# Patient Record
Sex: MALE | Race: WHITE
[De-identification: names, ages, dates, MRNs, and addresses within clinical notes are randomized per-mention and may not be internally consistent; named-entity substitution may affect disease eponyms.]

---

## 2021-01-17 ENCOUNTER — HOSPITAL ENCOUNTER (EMERGENCY)
Dept: HOSPITAL 74 - JVIRT | Age: 26
Discharge: HOME | End: 2021-01-17
Payer: COMMERCIAL

## 2021-01-17 DIAGNOSIS — Z20.822: Primary | ICD-10-CM

## 2021-01-17 PROCEDURE — C9803 HOPD COVID-19 SPEC COLLECT: HCPCS

## 2021-01-17 PROCEDURE — U0003 INFECTIOUS AGENT DETECTION BY NUCLEIC ACID (DNA OR RNA); SEVERE ACUTE RESPIRATORY SYNDROME CORONAVIRUS 2 (SARS-COV-2) (CORONAVIRUS DISEASE [COVID-19]), AMPLIFIED PROBE TECHNIQUE, MAKING USE OF HIGH THROUGHPUT TECHNOLOGIES AS DESCRIBED BY CMS-2020-01-R: HCPCS

## 2021-05-12 ENCOUNTER — TRANSCRIPTION ENCOUNTER (OUTPATIENT)
Age: 26
End: 2021-05-12

## 2021-09-09 ENCOUNTER — TRANSCRIPTION ENCOUNTER (OUTPATIENT)
Age: 26
End: 2021-09-09

## 2021-09-20 ENCOUNTER — TRANSCRIPTION ENCOUNTER (OUTPATIENT)
Age: 26
End: 2021-09-20

## 2021-10-11 ENCOUNTER — TRANSCRIPTION ENCOUNTER (OUTPATIENT)
Age: 26
End: 2021-10-11

## 2022-05-27 ENCOUNTER — NON-APPOINTMENT (OUTPATIENT)
Age: 27
End: 2022-05-27

## 2022-07-21 ENCOUNTER — NON-APPOINTMENT (OUTPATIENT)
Age: 27
End: 2022-07-21

## 2022-07-27 ENCOUNTER — NON-APPOINTMENT (OUTPATIENT)
Age: 27
End: 2022-07-27

## 2022-09-01 ENCOUNTER — NON-APPOINTMENT (OUTPATIENT)
Age: 27
End: 2022-09-01

## 2022-10-05 ENCOUNTER — NON-APPOINTMENT (OUTPATIENT)
Age: 27
End: 2022-10-05

## 2022-10-05 DIAGNOSIS — Z78.9 OTHER SPECIFIED HEALTH STATUS: ICD-10-CM

## 2022-10-05 DIAGNOSIS — Z87.09 PERSONAL HISTORY OF OTHER DISEASES OF THE RESPIRATORY SYSTEM: ICD-10-CM

## 2022-10-05 PROBLEM — Z00.00 ENCOUNTER FOR PREVENTIVE HEALTH EXAMINATION: Status: ACTIVE | Noted: 2022-10-05

## 2022-10-17 ENCOUNTER — NON-APPOINTMENT (OUTPATIENT)
Age: 27
End: 2022-10-17

## 2022-10-17 ENCOUNTER — LABORATORY RESULT (OUTPATIENT)
Age: 27
End: 2022-10-17

## 2022-10-17 ENCOUNTER — APPOINTMENT (OUTPATIENT)
Dept: GASTROENTEROLOGY | Facility: CLINIC | Age: 27
End: 2022-10-17

## 2022-10-17 VITALS
DIASTOLIC BLOOD PRESSURE: 64 MMHG | HEART RATE: 80 BPM | HEIGHT: 64 IN | WEIGHT: 112 LBS | BODY MASS INDEX: 19.12 KG/M2 | SYSTOLIC BLOOD PRESSURE: 118 MMHG

## 2022-10-17 DIAGNOSIS — Z84.89 FAMILY HISTORY OF OTHER SPECIFIED CONDITIONS: ICD-10-CM

## 2022-10-17 PROCEDURE — 99204 OFFICE O/P NEW MOD 45 MIN: CPT

## 2022-10-17 RX ORDER — LINACLOTIDE 72 UG/1
72 CAPSULE, GELATIN COATED ORAL
Refills: 0 | Status: ACTIVE | COMMUNITY

## 2022-10-17 NOTE — HISTORY OF PRESENT ILLNESS
[FreeTextEntry1] : \par \par  \par \par \par This HPI  reflects a summary and review of records : including previous and most recent  Labs, body imaging, consults and progress notes, operative and pathology reports, EKG reports, ED records, found in [facility], [facility],  [place] and any additional records brought in by  the patient at the time of the visit.\par \par \par \par PCP: Cl \par \par 26 yo M w h/o Allergies\par Hemorrhoids\par \par 10/17/22    Today:  no c/o , CP, SOB/ ELENA, Cough, Wheeze, Palpitations, edema\par \par 10/17/22   Today: Recently saw PCP and started on Linzess 72 qd fro IBS\par                   Patient has a 2 yr h/o stomach problems\par                   C/O stomach always churning, cruzito after eating, w/i 30 mins feels like he needs to defecate\par                   BMs: # 4 qod--usually w some straining and bloat\par                   Since Linzess had BM qd but diarrhea\par                   No brb/ mucous WA, jt pain, rash, visual changes\par                   Not a great sleeper\par                   works w yg troubled adults, 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\par                   [id number], usual, \par                   Diet:  first meal is Lunch: Rice & beans, occas D is chicken\par             \par \par * Abd pain-->lower, w bloating \par * Nausea--> occas \par * Vomit--> no\par * Early satiety--> no\par * Belching--> no\par * Hiccups--> no\par * Regurgitation--> no\par * Acid Taste / Water Brash--> no\par * Ht burn--> no\par * Dysphagia--> no\par * Throat Clearing--> no\par * Hoarseness--> no\par * Post-Nasal Drip--> no\par * Congestion--> no\par * Globus--> no\par * Cough--> no\par * Wheeze / PC-> -no\par * BMs: # 4 qod\par * Constipation--> yes \par * Diarrhea--> w Linzess\par * Bloating--> yes\par * Strain on Defecation--> yes\par * Incompl Evac--> no\par * Flatulence--> no\par * Gurgling--> yes\par * Melena--> no\par * BPBPR-> -no\par * Anorexia--> no\par * Wt. Loss--> no\par \par

## 2022-10-17 NOTE — REVIEW OF SYSTEMS
[Scleral Icterus (Yellow Eyes)] : no scleral icterus [Skin Lesions] : no skin lesions [Confused] : no confusion [Proptosis] : no proptosis [Easy Bruising] : no tendency for easy bruising

## 2022-10-17 NOTE — ASSESSMENT
[FreeTextEntry1] : \par \par 1. Lower Abd pain\par assoc w Bloat, Borborygmi, constipation\par on Linzess--> diarrhea\par \par R/O : IBD\par          Celiac\par          Malabsorption\par          IBS is a dx of Exclusion\par \par P: \par check the following:\par Labs: cbc, cmet, esr, crp, iron/celiac/IBD/food allergy panels, B12, FA, TSH,  Vit D, lipase, \par        \par   would decr Linzess to 72mg qod for now\par      If IBS would improve diet first, since it is very low in Fiber\par \par Recommend: \par * Diet: moderate -High Fiber,  Low Fat & Lactose free, Low FODMAPs--was reviewed & emphasized\par * Daily fluid intake was reviewed : 6  --  8 cups of decaffeinated fluid daily was emphasized\par * Regular aerobic exercise was emphasized\par * Probiotics  1  daily\par * Miralax 1/2 cap qd \par * Neuromodulation:  not currently required\par \par \par \par \par \par \par \par 2. Hemorrhoids:  well - controlled.  No pain,  swelling,  itch,  bleeding\par * Discussed  the  potential complications of thrombosis,  pain,  infection,  swelling, itching,  bleeding --none recently\par Recommendations: \par * Moderate- High  Fiber Diet was reviewed and emphasized\par * 6  --  8 cups of decaffeinated fluid daily was emphasized \par * Sitz Bathes as needed ,  No:  Anusol HC  Suppos / Cream  WA BID -- was needed\par * No:  Tucks BID,  Balneol Lotion,   Calmoseptine Oint -- was needed ;    can use  Prep H prn\par * No:  need for  Colorectal surgical evaluation for possible ablation \par \par \par \par \par \par \par \par

## 2022-10-18 LAB
ALBUMIN SERPL ELPH-MCNC: 5.2 G/DL
ALP BLD-CCNC: 66 U/L
ALT SERPL-CCNC: 21 U/L
ANION GAP SERPL CALC-SCNC: 11 MMOL/L
AST SERPL-CCNC: 22 U/L
BASOPHILS # BLD AUTO: 0.02 K/UL
BASOPHILS NFR BLD AUTO: 0.2 %
BILIRUB SERPL-MCNC: 0.2 MG/DL
BUN SERPL-MCNC: 5 MG/DL
CALCIUM SERPL-MCNC: 10.1 MG/DL
CHLORIDE SERPL-SCNC: 103 MMOL/L
CO2 SERPL-SCNC: 28 MMOL/L
CREAT SERPL-MCNC: 0.81 MG/DL
CRP SERPL-MCNC: <3 MG/L
EGFR: 125 ML/MIN/1.73M2
EOSINOPHIL # BLD AUTO: 0.08 K/UL
EOSINOPHIL NFR BLD AUTO: 0.9 %
FERRITIN SERPL-MCNC: 66 NG/ML
FOLATE SERPL-MCNC: >20 NG/ML
GLUCOSE SERPL-MCNC: 81 MG/DL
HCT VFR BLD CALC: 45.2 %
HGB BLD-MCNC: 14.4 G/DL
IMM GRANULOCYTES NFR BLD AUTO: 0.2 %
IRON SATN MFR SERPL: 28 %
IRON SERPL-MCNC: 94 UG/DL
LPL SERPL-CCNC: 23 U/L
LYMPHOCYTES # BLD AUTO: 1.88 K/UL
LYMPHOCYTES NFR BLD AUTO: 21.6 %
MAN DIFF?: NORMAL
MCHC RBC-ENTMCNC: 28.1 PG
MCHC RBC-ENTMCNC: 31.9 GM/DL
MCV RBC AUTO: 88.3 FL
MONOCYTES # BLD AUTO: 0.64 K/UL
MONOCYTES NFR BLD AUTO: 7.3 %
NEUTROPHILS # BLD AUTO: 6.07 K/UL
NEUTROPHILS NFR BLD AUTO: 69.8 %
PLATELET # BLD AUTO: 205 K/UL
POTASSIUM SERPL-SCNC: 3.7 MMOL/L
PROT SERPL-MCNC: 7.5 G/DL
RBC # BLD: 5.12 M/UL
RBC # FLD: 13.9 %
SODIUM SERPL-SCNC: 143 MMOL/L
TIBC SERPL-MCNC: 336 UG/DL
TSH SERPL-ACNC: 1.09 UIU/ML
UIBC SERPL-MCNC: 242 UG/DL
VIT B12 SERPL-MCNC: 624 PG/ML
WBC # FLD AUTO: 8.71 K/UL

## 2022-10-19 LAB
BARLEY IGE QN: <0.1 KUA/L
CHERRY IGE QN: 0.68 KUA/L
COW MILK IGE QN: <0.1 KUA/L
CRAB IGE QN: 0.5 KUA/L
DEPRECATED BARLEY IGE RAST QL: 0
DEPRECATED CHERRY IGE RAST QL: 1
DEPRECATED COW MILK IGE RAST QL: 0
DEPRECATED CRAB IGE RAST QL: 1
DEPRECATED EGG WHITE IGE RAST QL: 0
DEPRECATED OAT IGE RAST QL: NORMAL
DEPRECATED PEANUT IGE RAST QL: 1
DEPRECATED RYE IGE RAST QL: 0
DEPRECATED SOYBEAN IGE RAST QL: 0
DEPRECATED WHEAT IGE RAST QL: 0
EGG WHITE IGE QN: <0.1 KUA/L
OAT IGE QN: 0.22 KUA/L
PEANUT IGE QN: 0.48 KUA/L
RYE IGE QN: <0.1 KUA/L
SOYBEAN IGE QN: <0.1 KUA/L
TOTAL IGE SMQN RAST: 161 KU/L
WHEAT IGE QN: <0.1 KUA/L

## 2022-10-20 LAB
GLIADIN IGA SER QL: <5 UNITS
GLIADIN IGG SER QL: <5 UNITS
GLIADIN PEPTIDE IGA SER-ACNC: NEGATIVE
GLIADIN PEPTIDE IGG SER-ACNC: NEGATIVE
TTG IGA SER IA-ACNC: <1.2 U/ML
TTG IGA SER-ACNC: NEGATIVE
TTG IGG SER IA-ACNC: <1.2 U/ML
TTG IGG SER IA-ACNC: NEGATIVE

## 2022-10-21 LAB
BAKER'S YEAST AB QL: 16.4 UNITS
BAKER'S YEAST IGA QL IA: 27.5 UNITS
BAKER'S YEAST IGA QN IA: ABNORMAL
BAKER'S YEAST IGG QN IA: NEGATIVE

## 2022-11-09 ENCOUNTER — NON-APPOINTMENT (OUTPATIENT)
Age: 27
End: 2022-11-09

## 2022-11-21 ENCOUNTER — APPOINTMENT (OUTPATIENT)
Dept: GASTROENTEROLOGY | Facility: CLINIC | Age: 27
End: 2022-11-21

## 2022-11-21 VITALS
HEIGHT: 64 IN | HEART RATE: 75 BPM | DIASTOLIC BLOOD PRESSURE: 80 MMHG | OXYGEN SATURATION: 97 % | BODY MASS INDEX: 18.78 KG/M2 | SYSTOLIC BLOOD PRESSURE: 122 MMHG | WEIGHT: 110 LBS

## 2022-11-21 DIAGNOSIS — R76.0 RAISED ANTIBODY TITER: ICD-10-CM

## 2022-11-21 DIAGNOSIS — R19.8 OTHER SPECIFIED SYMPTOMS AND SIGNS INVOLVING THE DIGESTIVE SYSTEM AND ABDOMEN: ICD-10-CM

## 2022-11-21 DIAGNOSIS — R14.0 ABDOMINAL DISTENSION (GASEOUS): ICD-10-CM

## 2022-11-21 DIAGNOSIS — R10.9 UNSPECIFIED ABDOMINAL PAIN: ICD-10-CM

## 2022-11-21 DIAGNOSIS — K64.8 OTHER HEMORRHOIDS: ICD-10-CM

## 2022-11-21 PROCEDURE — 99215 OFFICE O/P EST HI 40 MIN: CPT

## 2022-11-21 NOTE — HISTORY OF PRESENT ILLNESS
[FreeTextEntry1] : \par \par This HPI  reflects a summary and review of records : including previous and most recent  Labs, body imaging, consults and progress notes, operative and pathology reports, EKG reports, ED records, found in [place], [facility],  [place] and any additional records brought in by  the patient at the time of the visit.\par \par \par \par PCP: Cl \par \par 28 yo M w h/o Allergies\par Hemorrhoids\par \par 10/17/22  Init CC: Recently saw PCP and started on Linzess 72 qd for IBS\par                   Patient has a 2 yr h/o stomach problems\par                   C/O stomach always churning, cruzito after eating, w/i 30 mins feels like he needs to defecate\par                   BMs: # 4 qod--usually w some straining and bloat\par                   Since Linzess had BM qd but diarrhea\par                   No brb/ mucous DE, jt pain, rash, visual changes\par                   Not a great sleeper\par                   works w yg troubled adults, 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\par                   qp=[id number], usual, \par                   Diet:  first meal is Lunch: Rice & beans, occas D is chicken\par \par  Labs: cbc, cmet, esr, crp, iron/celiac panels, B12, FA, TSH,  Vit D, lipase--> wnl \par             Food allergy--> + peanuts/crab/cherry;  + ASCA IgA=27\par \par \par 11/21/22    Today:  no c/o , CP, SOB/ ELENA, Cough, Wheeze, Palpitations, edema\par                   Taking Linzess 72 qod or q 3days \par \par \par * Abd pain-->lower--> some but better \par * Nausea--> less\par * BMs: # 4, qd or qod \par * Constipation--> better\par * Diarrhea--> w Linzess\par * Bloating--> better\par * Strain on Defecation--> better \par * Incompl Evac--> no\par * Flatulence--> no\par * Gurgling--> \par * Vomit--> no\par * Early satiety--> no\par * Belching--> no\par * Hiccups--> no\par * Regurgitation--> no\par * Acid Taste / Water Brash--> no\par * Ht burn--> no\par * Dysphagia--> no\par * Throat Clearing--> no\par * Hoarseness--> no\par * Post-Nasal Drip--> no\par * Congestion--> no\par * Globus--> no\par * Cough--> no\par * Wheeze / PC-> -no\par * Melena--> no\par * BPBPR-> -no\par * Anorexia--> no\par * Wt. Loss--> no\par \par

## 2022-11-21 NOTE — ASSESSMENT
[FreeTextEntry1] : \par \par 1. Lower Abd pain\par assoc w Bloat, Borborygmi, constipation\par on Linzess--> diarrhea\par \par 10/17/22  Labs: cbc, cmet, esr, crp, iron/celiac panels, B12, FA, TSH,  Vit D, lipase--> wnl \par                           Food allergy--> + peanuts/crab/cherry;  + ASCA IgA=27\par \par No evidence of Celiac, malabsorption\par \par R/O : IBD\par          IBS is a dx of Exclusion\par \par P\par      CTE\par      Colonoscopy\par \par      would decr Linzess to 72mg qod for now\par      * Miralax 1/2 cap qd \par      If IBS would improve diet first, since it is very low in Fiber\par \par Recommend: \par * Diet: moderate -High Fiber,  Low Fat & Lactose free, Low FODMAPs--was reviewed & emphasized\par * Daily fluid intake was reviewed : 6  --  8 cups of decaffeinated fluid daily was emphasized\par * Regular aerobic exercise was emphasized\par * Probiotics  1  daily\par \par * Neuromodulation:  not currently required\par \par \par \par \par \par \par \par 2. Hemorrhoids:  well - controlled.  No pain,  swelling,  itch,  bleeding\par * Discussed  the  potential complications of thrombosis,  pain,  infection,  swelling, itching,  bleeding --none recently\par Recommendations: \par * Moderate- High  Fiber Diet was reviewed and emphasized\par * 6  --  8 cups of decaffeinated fluid daily was emphasized \par * Sitz Bathes as needed ,  No:  Anusol HC  Suppos / Cream  UT BID -- was needed\par * No:  Tucks BID,  Balneol Lotion,   Calmoseptine Oint -- was needed ;    can use  Prep H prn\par * No:  need for  Colorectal surgical evaluation for possible ablation \par \par \par \par \par \par \par Informed Consent:\par * The risks & Benefits of   Colonoscopy were discussed w patient.\par * This included but was not limited to perforation, bleeding, sedation /med rxns possibly requiring surgery, blood transfusions, antibiotics & CPR/Intubation.\par * Pt. understands & agrees to the procedures.\par The following instructions in regards to the prep and medically essential ( cardiac, pulmonary, sz, psych, endocrine)  pre-op medication administration\par was reviewed and emphasized with the patient . \par * Pt. advised to D/C  ASA/NSAIDs  7  Days    PTP.\par * [ +++ ]  Dulcolax / Miralax / Mag. Citrate ,  [     ] Prepopik/ Clenpiq ,  [     ] Osmo Prep,  [    ] GoLytely,  prep. reviewed w Pt.\par * Hold  [           ] AM of procedure.\par * Hold  [           ] PM  before procedure.\par * Take  [           ] PM  before procedure.\par * Take  [           ] AM of procedure.\par \par \par

## 2022-11-21 NOTE — REVIEW OF SYSTEMS
[Scleral Icterus (Yellow Eyes)] : no scleral icterus [Skin Lesions] : no skin lesions [Confused] : no confusion [Easy Bruising] : no tendency for easy bruising

## 2023-04-16 ENCOUNTER — NON-APPOINTMENT (OUTPATIENT)
Age: 28
End: 2023-04-16

## 2024-03-10 ENCOUNTER — NON-APPOINTMENT (OUTPATIENT)
Age: 29
End: 2024-03-10

## 2024-03-15 ENCOUNTER — NON-APPOINTMENT (OUTPATIENT)
Age: 29
End: 2024-03-15

## 2025-03-21 ENCOUNTER — APPOINTMENT (OUTPATIENT)
Dept: FAMILY MEDICINE | Facility: CLINIC | Age: 30
End: 2025-03-21
Payer: COMMERCIAL

## 2025-03-21 VITALS
OXYGEN SATURATION: 98 % | SYSTOLIC BLOOD PRESSURE: 113 MMHG | HEART RATE: 66 BPM | BODY MASS INDEX: 20.83 KG/M2 | WEIGHT: 122 LBS | DIASTOLIC BLOOD PRESSURE: 73 MMHG | HEIGHT: 64 IN | TEMPERATURE: 98.6 F | RESPIRATION RATE: 16 BRPM

## 2025-03-21 DIAGNOSIS — Z00.00 ENCOUNTER FOR GENERAL ADULT MEDICAL EXAMINATION W/OUT ABNORMAL FINDINGS: ICD-10-CM

## 2025-03-21 PROCEDURE — 99385 PREV VISIT NEW AGE 18-39: CPT

## 2025-03-25 LAB
25(OH)D3 SERPL-MCNC: 33.3 NG/ML
ALBUMIN SERPL ELPH-MCNC: 4.7 G/DL
ALP BLD-CCNC: 81 U/L
ALT SERPL-CCNC: 27 U/L
ANION GAP SERPL CALC-SCNC: 11 MMOL/L
AST SERPL-CCNC: 21 U/L
BILIRUB SERPL-MCNC: 0.3 MG/DL
BUN SERPL-MCNC: 10 MG/DL
C TRACH RRNA SPEC QL NAA+PROBE: NOT DETECTED
CALCIUM SERPL-MCNC: 10 MG/DL
CHLORIDE SERPL-SCNC: 102 MMOL/L
CHOLEST SERPL-MCNC: 171 MG/DL
CO2 SERPL-SCNC: 26 MMOL/L
CREAT SERPL-MCNC: 0.78 MG/DL
EGFRCR SERPLBLD CKD-EPI 2021: 125 ML/MIN/1.73M2
ESTIMATED AVERAGE GLUCOSE: 114 MG/DL
GLUCOSE SERPL-MCNC: 101 MG/DL
HAV IGM SER QL: NONREACTIVE
HBA1C MFR BLD HPLC: 5.6 %
HBV CORE IGM SER QL: NONREACTIVE
HBV SURFACE AG SER QL: NONREACTIVE
HCT VFR BLD CALC: 45.3 %
HCV AB SER QL: NONREACTIVE
HCV S/CO RATIO: 0.15 S/CO
HDLC SERPL-MCNC: 48 MG/DL
HGB BLD-MCNC: 14.8 G/DL
HIV1+2 AB SPEC QL IA.RAPID: NONREACTIVE
LDLC SERPL-MCNC: 111 MG/DL
MCHC RBC-ENTMCNC: 27.5 PG
MCHC RBC-ENTMCNC: 32.7 G/DL
MCV RBC AUTO: 84 FL
N GONORRHOEA RRNA SPEC QL NAA+PROBE: NOT DETECTED
NONHDLC SERPL-MCNC: 122 MG/DL
PLATELET # BLD AUTO: 225 K/UL
POTASSIUM SERPL-SCNC: 5.1 MMOL/L
PROT SERPL-MCNC: 7.5 G/DL
RBC # BLD: 5.39 M/UL
RBC # FLD: 13.3 %
SODIUM SERPL-SCNC: 140 MMOL/L
SOURCE AMPLIFICATION: NORMAL
T PALLIDUM AB SER QL IA: NEGATIVE
TRIGL SERPL-MCNC: 55 MG/DL
TSH SERPL-ACNC: 0.69 UIU/ML
WBC # FLD AUTO: 8.77 K/UL

## 2025-03-29 ENCOUNTER — TRANSCRIPTION ENCOUNTER (OUTPATIENT)
Age: 30
End: 2025-03-29

## 2025-05-08 DIAGNOSIS — Z02.1 ENCOUNTER FOR PRE-EMPLOYMENT EXAMINATION: ICD-10-CM

## 2025-05-09 PROBLEM — Z02.1 PRE-EMPLOYMENT EXAMINATION: Status: ACTIVE | Noted: 2025-05-09
